# Patient Record
Sex: FEMALE | Race: WHITE
[De-identification: names, ages, dates, MRNs, and addresses within clinical notes are randomized per-mention and may not be internally consistent; named-entity substitution may affect disease eponyms.]

---

## 2019-02-14 ENCOUNTER — HOSPITAL ENCOUNTER (OUTPATIENT)
Dept: HOSPITAL 53 - M SDC | Age: 49
Discharge: HOME | End: 2019-02-14
Attending: ORTHOPAEDIC SURGERY
Payer: COMMERCIAL

## 2019-02-14 VITALS — HEIGHT: 67 IN | WEIGHT: 231 LBS | BODY MASS INDEX: 36.26 KG/M2

## 2019-02-14 VITALS — DIASTOLIC BLOOD PRESSURE: 69 MMHG | SYSTOLIC BLOOD PRESSURE: 152 MMHG

## 2019-02-14 DIAGNOSIS — Z87.891: ICD-10-CM

## 2019-02-14 DIAGNOSIS — Z79.899: ICD-10-CM

## 2019-02-14 DIAGNOSIS — M76.61: ICD-10-CM

## 2019-02-14 DIAGNOSIS — M66.361: Primary | ICD-10-CM

## 2019-02-14 LAB — HCG UR QL: NEGATIVE

## 2019-02-14 PROCEDURE — 28119 REMOVAL OF HEEL SPUR: CPT

## 2019-02-14 PROCEDURE — 27691 REVISE LOWER LEG TENDON: CPT

## 2019-02-14 PROCEDURE — 27652 REPAIR/GRAFT ACHILLES TENDON: CPT

## 2019-02-14 PROCEDURE — 64445 NJX AA&/STRD SCIATIC NRV IMG: CPT

## 2019-02-14 PROCEDURE — 84703 CHORIONIC GONADOTROPIN ASSAY: CPT

## 2019-02-21 NOTE — RO
DATE OF PROCEDURE:  02/14/2019

 

PREOPERATIVE DIAGNOSIS: Right chronic Achilles rupture.

 

POSTOPERATIVE DIAGNOSIS: Right chronic Achilles rupture.

 

SURGEON: Latoya Rojas MD

 

ASSISTANT: Gurwinder Mc MD

 

PROCEDURES:

1. Right chronic Achilles rupture tendon repair with hamstring allograft.

2. Debridement right Achilles tendon.

3. Flexor hallicis longus transfer to the calcaneus.

4. Calcaneal exostectomy (Frdei's deformity)

 

ANESTHESIA: General anesthesia plus popliteal nerve block.

 

ESTIMATED BLOOD LOSS: 5 mL.

 

COMPLICATIONS: None.

 

CONDITION: Stable to recovery.

 

IMPLANTS: Arthrex Peak bio tenodesis screw times two measuring 6.25 x 15 mm, one

5.5 mm Peak screw anchor, one hamstring allograft (semitendinous).

 

INDICATIONS: Celeste Schuster is a 48-year-old female with chronic Achilles rupture

who has failed conservative management. Risks and benefits of surgery were

discussed with the patient in detail and include but are not limited to

infection, wound dehiscence, damage to nerves and blood vessels, tendon not

healing, continued pain and stiffness, weakness, and need for additional

procedure, blood clot. Despite these risks the patient wished to proceed with

surgery. Informed consent was obtained in the office.

 

PROCEDURE: Patient was met in the preoperative holding area where her right lower

extremity was marked for the correct operative site. She was then taken to the

postanesthesia care unit where she underwent a popliteal nerve block by

anesthesia. Patient was then brought to the operating room. She received

antibiotics within 60 minutes prior to incision. A well padded tourniquet was

placed on her right upper thigh. She underwent general anesthesia and then was

placed in the prone position. Her bony prominences were well padded. A

Chlorhexidine scrub was performed to the right lower extremity. The right lower

extremity was then prepped and draped in the normal sterile fashion. Patient's

contralateral extremity ws examined prior to prepping and draping to assess for

her normal tension from her Achilles tendon.

An official time out was held to identify the correct patient, operative site and

procedure were verified. Appropriate imaging was displaced in the operating room.

An incision was made directly midline over the Achilles tendon. Careful

dissection to the level of peritenon was performed with care to avoid the seral

nerve. Peritenon was excised and there was found to be a large approximately 4 cm

gap between the tendon ends. The entire tendon was very tendonitic and thickened

with fairly poor degenerative tissue. Tendon was dissected out there was found to

be a fair amount of scar tissue anterior to the gap at this point. A FiberWire

stitch was placed through the proximal end of the tendon. Traction was placed on

tendon for approximately 5 minutes which allowed us to close down the gap

somewhat. For direct repair of the tendon, the tendon would have been under

significant tension and patient's foot would have been in maximum plantar

flexion. A decision was made to use an allograft tendon to bridge the gap which

was approximately 1.5-2 cm with the patient in slight plantar flexion. This was a

similar attention to her other side. The decision was also made to transfer the

flexor hallicis longus tendon given the degree of tendinosis found in the

patient's native Achilles.

 

At this point the FHL tendon was identified. It was tracked distally and excised

with a long handle knife in the medial to lateral direction with care to avoid

the medial structures. The tendon was whipped stitched and sized to a 6. It was

then transferred just anterior to the Achilles insertion and secured with a 6.25

times 15 mm bio tenodesis screw. We did ream for 6.5 mm. The tendon was tensioned

in neutral and was found to have good tension.

 

Once the transfer was complete attention was turned back to the Achilles rupture.

Semitendinous graft was selected. It was secured just anterior and medial to the

Achilles insertion  with a 6.25 mm bio tenodesis screw. The tendon was then

brought up proximally and woven through  the proximal aspect of the tendon in the

medial to lateral direction. It was then secured back distally on the

anterolateral aspect of the Achilles insertion using a 5.5 mm cork screw. There

was good tension on the graft with the foot in slight plantar flexion. At this

point the small amount of distal stump ws secured to the graft. The two sides of

the graft were then also secured together using 0-Vicryl. Prior to this two

sutures using 0 Vicryl had been used to attach both the medial and lateral

proximal aspects of the graft to the native Achilles tendon. At this point the

graft was found to be secure and there was a nice repair of the Achilles tendon

and both the tendon and FHL tendon transfer had good tension. Copious irrigation

was performed. The peritenon was closed  while possible. Subcutaneous tissues

were closed using 3-0 Vicryl and skin was closed using 3-0 Nylon. Patient was

placed in to a well padded splint in slight plantar flexion. She was then brought

back to the supine position on the hospital bed and extubated without difficulty.

She was transferred to the recovery room in stable condition. All counts were

correct at the end of the case.

 

PLAN: Patient will be nonweightbearing for at least 6 weeks. We will see her back

in one week for a wound check and transfer to a cast. She will be on Xarelto 10

mg by mouth daily for  DVT prophylaxis.

## 2021-05-10 ENCOUNTER — HOSPITAL ENCOUNTER (OUTPATIENT)
Dept: HOSPITAL 53 - M SFHCRHEU | Age: 51
End: 2021-05-10
Attending: INTERNAL MEDICINE
Payer: COMMERCIAL

## 2021-05-10 DIAGNOSIS — M06.4: Primary | ICD-10-CM

## 2021-05-10 DIAGNOSIS — R21: ICD-10-CM

## 2021-05-10 DIAGNOSIS — R53.82: ICD-10-CM

## 2021-05-10 DIAGNOSIS — M79.10: ICD-10-CM

## 2021-05-10 LAB
25(OH)D3 SERPL-MCNC: 26.8 NG/ML (ref 30–100)
ALBUMIN SERPL BCG-MCNC: 4 GM/DL (ref 3.2–5.2)
ALT SERPL W P-5'-P-CCNC: 30 U/L (ref 12–78)
APPEARANCE UR: CLEAR
BACTERIA UR QL AUTO: NEGATIVE
BASOPHILS # BLD AUTO: 0.1 10^3/UL (ref 0–0.2)
BASOPHILS NFR BLD AUTO: 1 % (ref 0–1)
BILIRUB CONJ SERPL-MCNC: < 0.1 MG/DL (ref 0–0.2)
BILIRUB SERPL-MCNC: 0.2 MG/DL (ref 0.2–1)
BILIRUB UR QL STRIP.AUTO: NEGATIVE
BUN SERPL-MCNC: 25 MG/DL (ref 7–18)
C3 SERPL-MCNC: 127 MG/DL (ref 90–180)
C4 SERPL-MCNC: 33 MG/DL (ref 10–40)
CALCIUM SERPL-MCNC: 9.2 MG/DL (ref 8.5–10.1)
CHLORIDE SERPL-SCNC: 107 MEQ/L (ref 98–107)
CK SERPL-CCNC: 88 U/L (ref 26–192)
CO2 SERPL-SCNC: 27 MEQ/L (ref 21–32)
CREAT SERPL-MCNC: 0.76 MG/DL (ref 0.55–1.3)
CREAT UR-MCNC: 102 MG/DL
CRP SERPL-MCNC: 1.48 MG/DL (ref 0–0.3)
EOSINOPHIL # BLD AUTO: 0.6 10^3/UL (ref 0–0.5)
EOSINOPHIL NFR BLD AUTO: 6.4 % (ref 0–3)
ERYTHROCYTE [SEDIMENTATION RATE] IN BLOOD BY WESTERGREN METHOD: 7 MM/HR (ref 0–30)
GFR SERPL CREATININE-BSD FRML MDRD: > 60 ML/MIN/{1.73_M2} (ref 51–?)
GLUCOSE SERPL-MCNC: 82 MG/DL (ref 70–100)
GLUCOSE UR QL STRIP.AUTO: NEGATIVE MG/DL
HCT VFR BLD AUTO: 43.8 % (ref 36–47)
HGB BLD-MCNC: 13.7 G/DL (ref 12–15.5)
HGB UR QL STRIP.AUTO: NEGATIVE
IGA SERPL-MCNC: 246 MG/DL (ref 70–400)
IGG SERPL-MCNC: 936 MG/DL (ref 681–1648)
IGM SERPL-MCNC: 156 MG/DL (ref 40–230)
IRON SERPL-MCNC: 53 UG/DL (ref 50–170)
KETONES UR QL STRIP.AUTO: NEGATIVE MG/DL
LEUKOCYTE ESTERASE UR QL STRIP.AUTO: NEGATIVE
LYMPHOCYTES # BLD AUTO: 3.6 10^3/UL (ref 1.5–5)
LYMPHOCYTES NFR BLD AUTO: 36.3 % (ref 24–44)
MAGNESIUM SERPL-MCNC: 2.1 MG/DL (ref 1.8–2.4)
MCH RBC QN AUTO: 27.3 PG (ref 27–33)
MCHC RBC AUTO-ENTMCNC: 31.3 G/DL (ref 32–36.5)
MCV RBC AUTO: 87.3 FL (ref 80–96)
MONOCYTES # BLD AUTO: 0.6 10^3/UL (ref 0–0.8)
MONOCYTES NFR BLD AUTO: 6.1 % (ref 2–8)
MUCOUS THREADS URNS QL MICRO: (no result)
NEUTROPHILS # BLD AUTO: 5 10^3/UL (ref 1.5–8.5)
NEUTROPHILS NFR BLD AUTO: 49.6 % (ref 36–66)
NITRITE UR QL STRIP.AUTO: NEGATIVE
PH UR STRIP.AUTO: 5 UNITS (ref 5–9)
PHOSPHATE SERPL-MCNC: 3.5 MG/DL (ref 2.5–4.9)
PLATELET # BLD AUTO: 353 10^3/UL (ref 150–450)
POTASSIUM SERPL-SCNC: 3.9 MEQ/L (ref 3.5–5.1)
PROT SERPL-MCNC: 7.5 GM/DL (ref 6.4–8.2)
PROT UR QL STRIP.AUTO: NEGATIVE MG/DL
PROT UR-MCNC: 12.6 MG/DL (ref 0–12)
RBC # BLD AUTO: 5.02 10^6/UL (ref 4–5.4)
RBC # UR AUTO: 1 /HPF (ref 0–3)
RHEUMATOID FACT SERPL-ACNC: < 10 IU/ML (ref ?–15)
SODIUM SERPL-SCNC: 140 MEQ/L (ref 136–145)
SP GR UR STRIP.AUTO: 1.02 (ref 1–1.03)
SQUAMOUS #/AREA URNS AUTO: 1 /HPF (ref 0–6)
UROBILINOGEN UR QL STRIP.AUTO: 0.2 MG/DL (ref 0–2)
VIT B12 SERPL-MCNC: 897 PG/ML (ref 247–911)
WBC # BLD AUTO: 10 10^3/UL (ref 4–10)
WBC #/AREA URNS AUTO: 0 /HPF (ref 0–3)

## 2021-05-11 LAB
ALBUMIN MFR UR ELPH: 60.1 % (ref 55.8–66.1)
ALBUMIN SERPL-MCNC: 4.51 GM/DL (ref 3.29–5.55)
ALPHA1 GLOB MFR SERPL ELPH: 4.3 % (ref 2.9–4.9)
ALPHA1 GLOB SERPL ELPH-MCNC: 0.32 GM/DL (ref 0.17–0.41)
ALPHA2 GLOB SERPL ELPH-MCNC: 0.81 GM/DL (ref 0.42–0.99)
ALPHA2 GLOB SERPL ELPH-MCNC: 10.8 % (ref 7.1–11.8)
B-GLOBULIN SERPL ELPH-MCNC: 0.47 GM/DL (ref 0.28–0.6)
BETA1 GLOB MFR SERPL ELPH: 6.2 % (ref 4.7–7.2)
BETA2 GLOB MFR SERPL ELPH: 5.3 % (ref 3.2–6.5)
BETA2 GLOB SERPL ELPH-MCNC: 0.4 GM/DL (ref 0.19–0.55)
GAMMA GLOB 24H MFR UR ELPH: 13.3 % (ref 11.1–18.8)
GAMMA GLOB SERPL ELPH-MCNC: 1 GM/DL (ref 0.65–1.58)
PROT PATTERN SERPL ELPH-IMP: (no result)

## 2021-06-21 ENCOUNTER — HOSPITAL ENCOUNTER (OUTPATIENT)
Dept: HOSPITAL 53 - M SLEEP HO | Age: 51
End: 2021-06-21
Attending: INTERNAL MEDICINE
Payer: COMMERCIAL

## 2021-06-21 DIAGNOSIS — R53.83: Primary | ICD-10-CM

## 2021-06-22 NOTE — SLEEPHOME
DATE:  06/21/2021



ORDERED BY:  Mer Grace MD



Diagnostic home sleep testing was performed due to concern for the obstructive

sleep apnea syndrome in this patient with a history of fatigue.



For testing, a nocturnal T3 respiratory monitoring device was used. Continuous

record was made of pulse, oxygen saturation, air flow, chest and abdominal

strain, and body position.  



Nine hours and 59 minutes of data were reviewed. There were 9 hours and 16

minutes marked as time in bed. During the interval marked time in bed, there

were 51 respiratory events identified of 10 seconds in duration or greater for

a respiratory event index of 5.5. The events were primarily obstructive, 12

mixed and central apneas are seen. Baseline pulse rate was 80. Pulse rate

ranged 56 to 112.  Baseline saturation was 92%. Saturations fell to 84%.

Testing was performed in both the supine and nonsupine positions.



IMPRESSION:  Abnormal home sleep testing with repetitive respiratory events and

oxygen desaturations to 84% with a respiratory event index of 5.5 is consistent

with the obstructive sleep apnea syndrome.  



RECOMMENDATION:  The patient should be encouraged to undergo a formal sleep

evaluation.

## 2021-10-25 ENCOUNTER — HOSPITAL ENCOUNTER (OUTPATIENT)
Dept: HOSPITAL 53 - M LAB REF | Age: 51
End: 2021-10-25
Attending: PHYSICIAN ASSISTANT
Payer: COMMERCIAL

## 2021-10-25 DIAGNOSIS — R21: Primary | ICD-10-CM

## 2022-06-06 ENCOUNTER — HOSPITAL ENCOUNTER (OUTPATIENT)
Dept: HOSPITAL 53 - M SFHCRHEU | Age: 52
End: 2022-06-06
Attending: INTERNAL MEDICINE
Payer: COMMERCIAL

## 2022-06-06 DIAGNOSIS — E55.9: ICD-10-CM

## 2022-06-06 DIAGNOSIS — M06.4: Primary | ICD-10-CM

## 2022-06-06 LAB
25(OH)D3 SERPL-MCNC: 29.2 NG/ML (ref 30–100)
ALBUMIN SERPL BCG-MCNC: 4 GM/DL (ref 3.2–5.2)
ALT SERPL W P-5'-P-CCNC: 42 U/L (ref 12–78)
APPEARANCE UR: CLEAR
BACTERIA UR QL AUTO: NEGATIVE
BASOPHILS # BLD AUTO: 0.1 10^3/UL (ref 0–0.2)
BASOPHILS NFR BLD AUTO: 0.9 % (ref 0–1)
BILIRUB CONJ SERPL-MCNC: < 0.1 MG/DL (ref 0–0.2)
BILIRUB SERPL-MCNC: 0.3 MG/DL (ref 0.2–1)
BILIRUB UR QL STRIP.AUTO: NEGATIVE
BUN SERPL-MCNC: 17 MG/DL (ref 7–18)
C3 SERPL-MCNC: 137 MG/DL (ref 90–180)
C4 SERPL-MCNC: 35 MG/DL (ref 10–40)
CALCIUM SERPL-MCNC: 10.2 MG/DL (ref 8.5–10.1)
CHLORIDE SERPL-SCNC: 106 MEQ/L (ref 98–107)
CO2 SERPL-SCNC: 30 MEQ/L (ref 21–32)
CREAT SERPL-MCNC: 0.73 MG/DL (ref 0.55–1.3)
CREAT UR-MCNC: 31.3 MG/DL
CRP SERPL-MCNC: 1.24 MG/DL (ref 0–0.3)
EOSINOPHIL # BLD AUTO: 0.3 10^3/UL (ref 0–0.5)
EOSINOPHIL NFR BLD AUTO: 3.7 % (ref 0–3)
ERYTHROCYTE [SEDIMENTATION RATE] IN BLOOD BY WESTERGREN METHOD: 12 MM/HR (ref 0–30)
GFR SERPL CREATININE-BSD FRML MDRD: > 60 ML/MIN/{1.73_M2} (ref 51–?)
GLUCOSE SERPL-MCNC: 98 MG/DL (ref 70–100)
GLUCOSE UR QL STRIP.AUTO: NEGATIVE MG/DL
HCT VFR BLD AUTO: 42.6 % (ref 36–47)
HGB BLD-MCNC: 13.4 G/DL (ref 12–15.5)
HGB UR QL STRIP.AUTO: NEGATIVE
KETONES UR QL STRIP.AUTO: NEGATIVE MG/DL
LEUKOCYTE ESTERASE UR QL STRIP.AUTO: NEGATIVE
LYMPHOCYTES # BLD AUTO: 2.6 10^3/UL (ref 1.5–5)
LYMPHOCYTES NFR BLD AUTO: 32.9 % (ref 24–44)
MCH RBC QN AUTO: 27.8 PG (ref 27–33)
MCHC RBC AUTO-ENTMCNC: 31.5 G/DL (ref 32–36.5)
MCV RBC AUTO: 88.4 FL (ref 80–96)
MONOCYTES # BLD AUTO: 0.6 10^3/UL (ref 0–0.8)
MONOCYTES NFR BLD AUTO: 7.5 % (ref 2–8)
NEUTROPHILS # BLD AUTO: 4.3 10^3/UL (ref 1.5–8.5)
NEUTROPHILS NFR BLD AUTO: 54.9 % (ref 36–66)
NITRITE UR QL STRIP.AUTO: NEGATIVE
PH UR STRIP.AUTO: 7 UNITS (ref 5–9)
PLATELET # BLD AUTO: 402 10^3/UL (ref 150–450)
POTASSIUM SERPL-SCNC: 4.5 MEQ/L (ref 3.5–5.1)
PROT SERPL-MCNC: 7.6 GM/DL (ref 6.4–8.2)
PROT UR QL STRIP.AUTO: NEGATIVE MG/DL
PROT UR-MCNC: 10.2 MG/DL (ref 0–12)
RBC # BLD AUTO: 4.82 10^6/UL (ref 4–5.4)
RBC # UR AUTO: 1 /HPF (ref 0–3)
SODIUM SERPL-SCNC: 142 MEQ/L (ref 136–145)
SP GR UR STRIP.AUTO: 1.01 (ref 1–1.03)
SQUAMOUS #/AREA URNS AUTO: 0 /HPF (ref 0–6)
UROBILINOGEN UR QL STRIP.AUTO: 0.2 MG/DL (ref 0–2)
WBC # BLD AUTO: 7.8 10^3/UL (ref 4–10)
WBC #/AREA URNS AUTO: 2 /HPF (ref 0–3)

## 2022-07-11 ENCOUNTER — HOSPITAL ENCOUNTER (OUTPATIENT)
Dept: HOSPITAL 53 - M PLAIMG | Age: 52
End: 2022-07-11
Attending: INTERNAL MEDICINE
Payer: COMMERCIAL

## 2022-07-11 DIAGNOSIS — M25.552: Primary | ICD-10-CM

## 2022-10-31 ENCOUNTER — HOSPITAL ENCOUNTER (OUTPATIENT)
Dept: HOSPITAL 53 - M SFHCRHEU | Age: 52
End: 2022-10-31
Attending: INTERNAL MEDICINE
Payer: COMMERCIAL

## 2022-10-31 DIAGNOSIS — E55.9: Primary | ICD-10-CM
